# Patient Record
Sex: MALE | Race: WHITE
[De-identification: names, ages, dates, MRNs, and addresses within clinical notes are randomized per-mention and may not be internally consistent; named-entity substitution may affect disease eponyms.]

---

## 2018-12-17 ENCOUNTER — HOSPITAL ENCOUNTER (EMERGENCY)
Dept: HOSPITAL 47 - EC | Age: 48
Discharge: HOME | End: 2018-12-17
Payer: COMMERCIAL

## 2018-12-17 VITALS
HEART RATE: 95 BPM | RESPIRATION RATE: 16 BRPM | DIASTOLIC BLOOD PRESSURE: 113 MMHG | SYSTOLIC BLOOD PRESSURE: 164 MMHG | TEMPERATURE: 97.9 F
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
## 2018-12-17 DIAGNOSIS — S09.90XA: Primary | ICD-10-CM

## 2018-12-17 DIAGNOSIS — Z88.6: ICD-10-CM

## 2018-12-17 PROCEDURE — 70450 CT HEAD/BRAIN W/O DYE: CPT

## 2018-12-17 PROCEDURE — 99284 EMERGENCY DEPT VISIT MOD MDM: CPT
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
## 2019-06-05 ENCOUNTER — HOSPITAL ENCOUNTER (EMERGENCY)
Dept: HOSPITAL 47 - EC | Age: 49
Discharge: HOME | End: 2019-06-05
Payer: COMMERCIAL

## 2019-06-05 VITALS
RESPIRATION RATE: 18 BRPM | DIASTOLIC BLOOD PRESSURE: 95 MMHG | SYSTOLIC BLOOD PRESSURE: 169 MMHG | HEART RATE: 98 BPM | TEMPERATURE: 98 F

## 2019-06-05 DIAGNOSIS — Z77.21: Primary | ICD-10-CM

## 2019-06-05 DIAGNOSIS — Z88.6: ICD-10-CM

## 2019-06-05 DIAGNOSIS — Z53.29: ICD-10-CM

## 2019-06-05 PROCEDURE — 99283 EMERGENCY DEPT VISIT LOW MDM: CPT
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
## 2021-11-26 ENCOUNTER — HOSPITAL ENCOUNTER (EMERGENCY)
Dept: HOSPITAL 47 - EC | Age: 51
LOS: 1 days | Discharge: HOME | End: 2021-11-27
Payer: COMMERCIAL

## 2021-11-26 DIAGNOSIS — Z02.89: Primary | ICD-10-CM

## 2021-11-26 PROCEDURE — 99499 UNLISTED E&M SERVICE: CPT

## 2021-11-27 VITALS
RESPIRATION RATE: 15 BRPM | DIASTOLIC BLOOD PRESSURE: 84 MMHG | TEMPERATURE: 98.4 F | SYSTOLIC BLOOD PRESSURE: 126 MMHG | HEART RATE: 71 BPM

## 2023-05-17 ENCOUNTER — HOSPITAL ENCOUNTER (EMERGENCY)
Dept: HOSPITAL 47 - EC | Age: 53
Discharge: HOME | End: 2023-05-17
Payer: COMMERCIAL

## 2023-05-17 VITALS
SYSTOLIC BLOOD PRESSURE: 122 MMHG | DIASTOLIC BLOOD PRESSURE: 80 MMHG | RESPIRATION RATE: 18 BRPM | HEART RATE: 72 BPM | TEMPERATURE: 98.2 F

## 2023-05-17 DIAGNOSIS — S81.852A: Primary | ICD-10-CM

## 2023-05-17 DIAGNOSIS — Z88.6: ICD-10-CM
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
## 2023-05-17 DIAGNOSIS — Z23: ICD-10-CM

## 2023-05-17 PROCEDURE — 99283 EMERGENCY DEPT VISIT LOW MDM: CPT

## 2023-05-17 PROCEDURE — 90471 IMMUNIZATION ADMIN: CPT

## 2023-05-17 PROCEDURE — 90715 TDAP VACCINE 7 YRS/> IM: CPT

## 2023-05-17 NOTE — ED
General Adult HPI





- General


Stated complaint: [facility] - dog bite


Time Seen by Provider: 05/17/23 18:50





- History of Present Illness


Initial comments: 


Dictation was produced using dragon dictation software. please excuse any 

grammatical, word or spelling errors. 











Chief Complaint: 53-year-old male presents with dog bite to the left leg





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  They were 

making around patient with an patient's dog bit him in the left leg.  The dog 

owner who was one of my patient's was interviewed states that dog has not been 

behaving abnormally.  Rabies vaccinations up-to-date.








The ROS documented in this emergency department record has been reviewed and 

confirmed by me.  Those systems with pertinent positive or negative responses 

have been documented in the HPI.  All other systems are other negative and/or 

noncontributory.

















- Related Data


                                  Previous Rx's











 Medication  Instructions  Recorded


 


Amoxic-Pot Clav 875-125Mg 1 tab PO BID 7 Days #14 tab 05/17/23





[Augmentin 875-125]  











                                    Allergies











Allergy/AdvReac Type Severity Reaction Status Date / Time


 


aspirin Allergy  Unknown Verified 05/17/23 18:59


 


NSAIDS (Non-Steroidal Allergy  Unknown Verified 05/17/23 18:59





Anti-Inflamma     














Review of Systems


ROS Statement: 


Those systems with pertinent positive or pertinent negative responses have been 

documented in the HPI.





ROS Other: All systems not noted in ROS Statement are negative.





Past Medical History


Past Medical History: No Reported History


History of Any Multi-Drug Resistant Organisms: None Reported


Past Surgical History: Appendectomy


Past Psychological History: No Psychological Hx Reported


Past Alcohol Use History: Rare


Past Drug Use History: None Reported





General Exam





- General Exam Comments


Initial Comments: 











PHYSICAL EXAM:


General Impression: Alert and oriented x3, not in acute distress


HEENT: Normocephalic atraumatic, extra-ocular movements intact, pupils equal and

reactive to light bilaterally, mucous membranes moist.


Cardiovascular: Heart regular rate and rhythm


Chest: Able to complete full sentences, no retractions, no tachypnea


Musculoskeletal: no peripheral edema


Motor:  no focal deficits noted


Neurological: CN II-XII grossly intact, no focal motor or sensory deficits noted


Skin: Small 1 x 1 cm area of superficial dog with abrasions.  No obvious 

puncture wounds


Psych: Normal affect and mood











Course


                                   Vital Signs











  05/17/23





  18:57


 


Temperature 98.2 F


 


Pulse Rate 72


 


Respiratory 18





Rate 


 


Blood Pressure 122/80


 


O2 Sat by Pulse 99





Oximetry 














Medical Decision Making





- Medical Decision Making


Was pt. sent in by a medical professional or institution (LATOYA Gupta, NP, urgent 

care, hospital, or nursing home...) When possible be specific


@  -No


Did you speak to anyone other than the patient for history (EMS, parent, family,

police, friend...)? What history was obtained from this source 


@  -Dog owner was interviewed states that dog was not behaving abnormally 

recently and has up-to-date vaccinations


Did you review nursing and triage notes (agree or disagree)?  Why? 


@  -I reviewed and agree with nursing and triage notes


Were old charts reviewed (outside hosp., previous admission, EMS record, old 

EKG, old radiological studies, urgent care reports/EKG's, nursing home records)?

Report findings 


@  -No old charts were reviewed


Differential Diagnosis (chest pain, altered mental status, abdominal pain women,

abdominal pain men, vaginal bleeding, musculoskeletal, weakness, fever, dyspnea,

syncope, headache, dizziness, GI bleed, back pain, seizure, CVA, palpatations, 

mental health)? 


@  -not applicable


EKG interpreted by me (3pts min.).


@  -None done


X-rays interpreted by me (1pt min.).


@  -None done


CT interpreted by me (1pt min.).


@  -None done


U/S interpreted by me (1pt. min.).


@  -None done


What testing was considered but not performed or refused? (CT, X-rays, U/S, 

labs)? Why?


@  -None


What meds were considered but not given or refused? Why?


@  -None


Did you discuss the management of the patient with other professionals 

(professionals i.e. LATOYA Gupta, NP, lab, RT, psych nurse, , , 

teacher, , )? Give summary


@  -No


Was smoking cessation discussed for >3mins.?


@  -No


Was critical care preformed (if so, how long)?


@  -No


Were there social determinants of health that impacted care today? How? 

(Homelessness, low income, unemployed, alcoholism, drug addiction, 

transportation, low edu. Level, literacy, decrease access to med. care, assisted, 

rehab)?


@  -No


Was there de-escalation of care discussed even if they declined (Discuss DNR or 

withdrawal of care, Hospice)? DNR status


@  -No


What co-morbidities impacted this encounter? (DM, HTN, Smoking, COPD, CAD, 

Cancer, CVA, ARF, Chemo, Hep., AIDS, mental health diagnosis, sleep apnea, 

morbid obesity)?


@  -None


Was patient admitted / discharged? Hospital course, mention meds given and rout

e, prescriptions, significant lab abnormalities, going to OR and other pertinent

info.


@  -53-year-old male presents emergency department after left leg dog bite.  

Vital signs stable.  Patient status updated.  Given prescription for Augmentin. 

Discharged.


Undiagnosed new problem with uncertain prognosis?


@  -No


Drug Therapy requiring intensive monitoring for toxicity (Heparin, Nitro, 

Insulin, Cardizem)?


@  -No


Were any procedures done?


@  -No


Diagnosis/symptom?  Acute, or Chronic, or Acute on Chronic?  Uncomplicated 

(without systemic symptoms) or Complicated (systemic symptoms)?


@  -Dog bite


Side effects of treatment?


@  -No


Exacerbation, Progression, or Severe Exacerbation?


@  -No


Poses a threat to life or bodily function? How? (Chest pain, USA, MI, pneumonia,

PE, COPD, DKA, ARF, appy, cholecystitis, CVA, Diverticulitis, Homicidal, 

Suicidal, threat to staff... and all critical care pts)


@  -yes








Disposition


Clinical Impression: 


 Dog bite





Disposition: HOME SELF-CARE


Condition: Good


Instructions (If sedation given, give patient instructions):  Animal Bite (ED)


Prescriptions: 


Amoxic-Pot Clav 875-125Mg [Augmentin 875-125] 1 tab PO BID 7 Days #14 tab


Is patient prescribed a controlled substance at d/c from ED?: No


Referrals: 


Richi Causey MD [Primary Care Provider] - 1-2 days


Time of Disposition: 18:52